# Patient Record
Sex: FEMALE | Race: WHITE | NOT HISPANIC OR LATINO | Employment: STUDENT | URBAN - METROPOLITAN AREA
[De-identification: names, ages, dates, MRNs, and addresses within clinical notes are randomized per-mention and may not be internally consistent; named-entity substitution may affect disease eponyms.]

---
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
## 2025-07-21 ENCOUNTER — EVALUATION (OUTPATIENT)
Dept: PHYSICAL THERAPY | Facility: CLINIC | Age: 25
End: 2025-07-21
Payer: COMMERCIAL

## 2025-07-21 DIAGNOSIS — M67.432 GANGLION CYST OF DORSUM OF LEFT WRIST: Primary | ICD-10-CM

## 2025-07-21 PROCEDURE — 97110 THERAPEUTIC EXERCISES: CPT | Performed by: PHYSICAL THERAPIST

## 2025-07-21 PROCEDURE — 97161 PT EVAL LOW COMPLEX 20 MIN: CPT | Performed by: PHYSICAL THERAPIST

## 2025-07-21 NOTE — LETTER
2025    Roderick Kapadia MD  [address]  [address]  [facility] [address]    Patient: Doreen Gomez   YOB: 2000   Date of Visit: 2025     Encounter Diagnosis     ICD-10-CM    1. Ganglion cyst of dorsum of left wrist  M67.432           Dear Dr. Roderick Kapadia MD:    Thank you for your recent referral of Doreen Gomez. Please review the attached evaluation summary from Doreen's recent visit.     Please verify that you agree with the plan of care by signing the attached order.     If you have any questions or concerns, please do not hesitate to call.     I sincerely appreciate the opportunity to share in the care of one of your patients and hope to have another opportunity to work with you in the near future.       Sincerely,    Jhony Flores, PT      Referring Provider:      I certify that I have read the below Plan of Care and certify the need for these services furnished under this plan of treatment while under my care.                    Roderick Kapadia MD  [address]  [address]  [facility] [address]  Via Fax: [phone]          PT Evaluation     Today's date: 2025  Patient name: Doreen Gomez  : 2000  MRN: [id number]  Referring provider: Roderick Kapadia MD  Dx:   Encounter Diagnosis     ICD-10-CM    1. Ganglion cyst of dorsum of left wrist  M67.432                      Assessment    Assessment details: Patient is a 24 y.o. female who  presents with pain, range of motion loss, weakness s/p excision of L dorsal wrist ganglion cyst, L first dorsal compartment release, L second dorsal compartment release  25 .  She has functional limitations as a result of impairments. Patient would benefit from course of skilled physical therapy to address above listed impairments in an effort to improve function.        Understanding of Dx/Px/POC: excellent    Goals  Goals  Short Term Goals:    1) Pain: Decrease pain to 2/10 at worst x 1 continuous week within 3-4  weeks  2) ROM: Improve L wrist ROM by at least 8 degrees for all noted as limited (up to full), improve L thumb ROM to full within 3-4 weeks.  3) Strength:Improve L  wrist strength by at least 1 grade for all noted as weak Increase   strength by at least 10# within 3-4 weeks.  4) Function: Improved FOTO self rated functional score (58@ IE) , patient to note greater ease with ADLs as they pertain to L wrist and hand within 3-4 weeks.    Long Term Goals:    1) Pain:Eliminate  pain x 1 continuous week within 6-8 weeks.  2) ROM: Full L wrist ROM within 6-8 weeks  2 Strength: Increase   strength by at least 15# within 6 -8weeks.  3) Function: Improved FOTO self rated functional score to at least  58, no difficulty with ADLs as they relate to L wrist and hand elbow within 6-8 weeks.  4)I with HEP      Plan  Patient would benefit from: skilled physical therapy  Planned modality interventions: TENS, ultrasound, cryotherapy, thermotherapy: hydrocollator packs, iontophoresis and low level laser therapy  Other planned modality interventions: all prn    Planned therapy interventions: stretching, strengthening, therapeutic exercise, home exercise program, manual therapy, massage, joint mobilization, patient education, ADL training and IASTM    Frequency: 2x week  Therapy duration (weeks): 6-8.  Treatment plan discussed with: patient      Subjective Evaluation    History of Present Illness  Mechanism of injury: Patient is a 24 y.o. old female who presents for an initial outpatient physical therapy consultation regarding her L wrist.    Notes chronic episodes of L wrist pain associated with ganglion cysts, previosly had treated with apritation and steroidal injection.    With pervasive pain  underwent an excision left dorsal wrist ganglion cyst on 6/2/25 .    Was braced with thumb spica for several weeks, now wearing less often as  she is trying to regain more of her motion.Has not worn brace to sleep for the past four  "weeks.        Current c/o pain,stiffness, and weakness.  Twisting and turning wrist specifically painful.     Per MD note 25: \"Goal is to increase with wrist flexion at this time. Patient also has goals to strengthen the wrist and thumb at hand therapy. Patient will follow-up in 6 weeks for reevaluation. Standard precautions were discussed. Patient advised to reach out via portal with any questions or concerns.\"              Patient Goals  Patient goals for therapy: decreased pain, increased strength, independence with ADLs/IADLs, return to sport/leisure activities and increased motion    Pain  Current pain ratin  At worst pain ratin    Hand dominance: right  Exercise history: very active: enjoys running, football, pickleball        Objective     Observations     Left Wrist/Hand   Positive for edema.     Additional Observation Details  (+) L surgical scar evident L dorsal radial wrist. Advised in use of Vit E for scar massage.  Mild L wrist swelling    Neurological Testing     Sensation     Wrist/Hand   Left   Diminished: light touch    Active Range of Motion     Left Elbow   Forearm supination: 90 degrees   Forearm pronation: 80 degrees     Left Wrist   Wrist flexion: 15 degrees with pain  Wrist extension: 10 degrees with pain  Radial deviation: 15 degrees with pain  Ulnar deviation: 20 degrees with pain      Right Wrist   Normal active range of motion    Left Thumb     Opposition: Can oppose L thumb to all fingers, limited L thumb abduction and extension    Passive Range of Motion     Left Wrist   Wrist flexion: 20 degrees with pain  Wrist extension: 20 degrees with pain  Radial deviation: Left wrist passive radial deviation: NT.   Ulnar deviation: Left wrist passive ulnar deviation: NT.     Strength/Myotome Testing     Left Wrist/Hand   Wrist extension: 2  Wrist flexion: 2  Radial deviation: 2 (pain)  Ulnar deviation: 2 (pain)     (2nd hand position)     Trial 1: 40    Right Wrist/Hand      " (2nd hand position)     Trial 1: 60             Precautions: none      Manuals 7/21/25            PROM L wrist flexion RG            PROM L wrist extension RG            PROM L wrist UD NV            PROM L wrist RD NV            PROM L thumb abd/ext NV            Direct contact low level aser therapy L dorsal wrist NV            Ther Ex             UBE NV            Flex bar flexion NV            Flex bar extension NV            Flex bar UD NV            Flex bar RD NV                                                   Ther Activity                                       Gait Training                                       Modalities             MH L wrist NV            IE/HEP RG

## 2025-07-21 NOTE — HOME EXERCISE EDUCATION
Program_ID:[id number]   Access Code: [email or web address]  URL: [email or web address]  Date: 07-  Prepared By: Jhony Flores    Program Notes      Exercises      - Seated Wrist Flexion AROM - 2 x daily - 7 x weekly -  sets - 10 reps - 5 hold      - Seated Wrist Extension AROM - 2 x daily - 7 x weekly -  sets - 10 reps - 5 hold      - Standing Wrist Extensor Stretch - 2 x daily - 7 x weekly -  sets - 10 reps - 10 hold      - Standing Wrist Extension Stretch - 2 x daily - 7 x weekly -  sets - 10 reps - 10 hold      - Putty Squeezes - 1-2 x daily -  x weekly -  sets -  reps

## 2025-07-24 ENCOUNTER — OFFICE VISIT (OUTPATIENT)
Dept: PHYSICAL THERAPY | Facility: CLINIC | Age: 25
End: 2025-07-24
Attending: PLASTIC SURGERY
Payer: COMMERCIAL

## 2025-07-24 DIAGNOSIS — M67.432 GANGLION CYST OF DORSUM OF LEFT WRIST: Primary | ICD-10-CM

## 2025-07-24 PROCEDURE — 97140 MANUAL THERAPY 1/> REGIONS: CPT | Performed by: PHYSICAL THERAPIST

## 2025-07-24 PROCEDURE — 97110 THERAPEUTIC EXERCISES: CPT | Performed by: PHYSICAL THERAPIST

## 2025-07-24 NOTE — PROGRESS NOTES
Daily Note     Today's date: 2025  Patient name: Doreen Gomez  : 2000  MRN: [id number]  Referring provider: Roderick Kapadia MD  Dx:   Encounter Diagnosis     ICD-10-CM    1. Ganglion cyst of dorsum of left wrist  M67.432           Start Time: 1220  Stop Time: 1309  Total time in clinic (min): 49 minutes    Subjective: Has been doing HEP. No new c/o since initia visit.        Objective: See treatment diary below    AROM Left Wrist   Wrist flexion: 45 degrees with pain  Wrist extension: 50 degrees with pain    Assessment: Tolerated treatment well. ROM improved significantly since initial visit.      Plan: Continue per plan of care.      Precautions: none      Manuals 25           PROM L wrist flexion RG RG           PROM L wrist extension RG RG           PROM L wrist UD NV RG           PROM L wrist RD NV RG           PROM L thumb abd/ext NV RG           Direct contact low level aser therapy L dorsal wrist NV            Ther Ex             UBE NV Yellow  2 x 10           Flex bar flexion NV Yello2  X 10           Flex bar extension NV Yellow  W2 x10           Flex bar UD NV Yellow  2 x 10           Flex bar RD NV Yellow  2 x 10                                                  Ther Activity                                       Gait Training                                       Modalities             MH L wrist NV 10 min (1)           IE/HEP RG

## 2025-07-29 ENCOUNTER — OFFICE VISIT (OUTPATIENT)
Dept: PHYSICAL THERAPY | Facility: CLINIC | Age: 25
End: 2025-07-29
Attending: PLASTIC SURGERY
Payer: COMMERCIAL

## 2025-07-29 DIAGNOSIS — M67.432 GANGLION CYST OF DORSUM OF LEFT WRIST: Primary | ICD-10-CM

## 2025-07-29 PROCEDURE — 97140 MANUAL THERAPY 1/> REGIONS: CPT

## 2025-07-29 PROCEDURE — 97110 THERAPEUTIC EXERCISES: CPT

## 2025-07-31 ENCOUNTER — OFFICE VISIT (OUTPATIENT)
Dept: PHYSICAL THERAPY | Facility: CLINIC | Age: 25
End: 2025-07-31
Attending: PLASTIC SURGERY
Payer: COMMERCIAL

## 2025-07-31 DIAGNOSIS — M67.432 GANGLION CYST OF DORSUM OF LEFT WRIST: Primary | ICD-10-CM

## 2025-07-31 PROCEDURE — 97110 THERAPEUTIC EXERCISES: CPT | Performed by: PHYSICAL THERAPIST

## 2025-07-31 PROCEDURE — 97140 MANUAL THERAPY 1/> REGIONS: CPT | Performed by: PHYSICAL THERAPIST

## 2025-08-05 ENCOUNTER — OFFICE VISIT (OUTPATIENT)
Dept: PHYSICAL THERAPY | Facility: CLINIC | Age: 25
End: 2025-08-05
Attending: PLASTIC SURGERY
Payer: COMMERCIAL

## 2025-08-05 DIAGNOSIS — M67.432 GANGLION CYST OF DORSUM OF LEFT WRIST: Primary | ICD-10-CM

## 2025-08-05 PROCEDURE — 97140 MANUAL THERAPY 1/> REGIONS: CPT | Performed by: PHYSICAL THERAPIST

## 2025-08-05 PROCEDURE — 97110 THERAPEUTIC EXERCISES: CPT | Performed by: PHYSICAL THERAPIST

## 2025-08-07 ENCOUNTER — OFFICE VISIT (OUTPATIENT)
Dept: PHYSICAL THERAPY | Facility: CLINIC | Age: 25
End: 2025-08-07
Attending: PLASTIC SURGERY
Payer: COMMERCIAL

## 2025-08-07 DIAGNOSIS — M67.432 GANGLION CYST OF DORSUM OF LEFT WRIST: Primary | ICD-10-CM

## 2025-08-07 PROCEDURE — 97110 THERAPEUTIC EXERCISES: CPT | Performed by: PHYSICAL THERAPIST

## 2025-08-07 PROCEDURE — 97140 MANUAL THERAPY 1/> REGIONS: CPT | Performed by: PHYSICAL THERAPIST

## 2025-08-11 ENCOUNTER — OFFICE VISIT (OUTPATIENT)
Dept: PHYSICAL THERAPY | Facility: CLINIC | Age: 25
End: 2025-08-11
Attending: PLASTIC SURGERY
Payer: COMMERCIAL

## 2025-08-13 ENCOUNTER — OFFICE VISIT (OUTPATIENT)
Dept: PHYSICAL THERAPY | Facility: CLINIC | Age: 25
End: 2025-08-13
Attending: PLASTIC SURGERY
Payer: COMMERCIAL

## 2025-08-19 ENCOUNTER — OFFICE VISIT (OUTPATIENT)
Dept: PHYSICAL THERAPY | Facility: CLINIC | Age: 25
End: 2025-08-19
Attending: PLASTIC SURGERY
Payer: COMMERCIAL

## 2025-08-19 DIAGNOSIS — M67.432 GANGLION CYST OF DORSUM OF LEFT WRIST: Primary | ICD-10-CM

## 2025-08-19 PROCEDURE — 97110 THERAPEUTIC EXERCISES: CPT

## 2025-08-19 PROCEDURE — 97140 MANUAL THERAPY 1/> REGIONS: CPT
